# Patient Record
Sex: FEMALE | Race: WHITE | NOT HISPANIC OR LATINO | Employment: PART TIME | URBAN - METROPOLITAN AREA
[De-identification: names, ages, dates, MRNs, and addresses within clinical notes are randomized per-mention and may not be internally consistent; named-entity substitution may affect disease eponyms.]

---

## 2017-03-15 ENCOUNTER — OFFICE VISIT (OUTPATIENT)
Dept: OBSTETRICS AND GYNECOLOGY | Facility: CLINIC | Age: 27
End: 2017-03-15

## 2017-03-15 DIAGNOSIS — R87.619 ATYPICAL ENDOCERVICAL CELLS ON PAP SMEAR: Primary | ICD-10-CM

## 2017-03-15 DIAGNOSIS — Z30.09 BIRTH CONTROL COUNSELING: ICD-10-CM

## 2017-03-15 DIAGNOSIS — Z30.011 ENCOUNTER FOR INITIAL PRESCRIPTION OF CONTRACEPTIVE PILLS: ICD-10-CM

## 2017-03-15 DIAGNOSIS — N94.3 PREMENSTRUAL SYNDROME: ICD-10-CM

## 2017-03-15 PROCEDURE — 99213 OFFICE O/P EST LOW 20 MIN: CPT | Performed by: OBSTETRICS & GYNECOLOGY

## 2017-03-15 PROCEDURE — 57510 CAUTERIZATION OF CERVIX: CPT | Performed by: OBSTETRICS & GYNECOLOGY

## 2017-03-15 NOTE — PROGRESS NOTES
Subjective:    Patient Corinne Mendes is a 26 y.o. female.   Chief Complaint   Patient presents with   • Colposcopy   • Abnormal Pap Smear   • Consult     week before periods have become worse with emotions.     Patient's here for follow-up for atypical cells patient has very significant premenstrual disorder with depression anxiety and anger emotions weight gain    HPI  patient since the birth of her child 4 years ago is had very significant premenstrual disorder is actually getting worse   patient has tried some over-the-counter medications with no help periods are moderately heavy she did discuss hysterectomy she at the present time does not want further children long discussion carried out about birth control      The following portions of the patient's history were reviewed and updated as appropriate: allergies, current medications, past family history, past medical history, past social history, past surgical history and problem list.      Review of Systems   Constitutional: Negative.    HENT: Negative.    Eyes: Negative.    Respiratory: Negative.    Cardiovascular: Negative.    Gastrointestinal: Negative for abdominal distention, abdominal pain, anal bleeding, blood in stool, constipation, diarrhea, nausea, rectal pain and vomiting.   Endocrine: Negative for cold intolerance, heat intolerance, polydipsia, polyphagia and polyuria.   Genitourinary: Positive for menstrual problem. Negative for decreased urine volume, dyspareunia, dysuria, enuresis, flank pain, frequency, genital sores, hematuria, pelvic pain, urgency, vaginal bleeding, vaginal discharge and vaginal pain.        Patient has significant issues starting the week prior to the period with the multiple symptoms are relating to the premenstrual syndrome patient does not desire children and is essentially watching her cycle she does note when she ovulates which she has severe ovulation pain she gets the significant weight gain swelling depression anxiety  birth control options are discussed in detail   Musculoskeletal: Negative.    Skin: Negative.    Allergic/Immunologic: Negative.    Neurological: Negative.    Hematological: Negative for adenopathy. Does not bruise/bleed easily.   Psychiatric/Behavioral: Negative for agitation, confusion and sleep disturbance. The patient is not nervous/anxious.          Objective:      Physical Exam   Constitutional: She appears well-developed and well-nourished. She is not intubated.   HENT:   Head: Hair is normal.   Nose: Nose normal.   Mouth/Throat: Oropharynx is clear and moist.   Eyes: Conjunctivae are normal.   Neck: Normal carotid pulses and no JVD present. No tracheal tenderness, no spinous process tenderness and no muscular tenderness present. Carotid bruit is not present. No rigidity. No edema, no erythema and normal range of motion present. No thyroid mass and no thyromegaly present.   Cardiovascular: Normal rate, regular rhythm, S1 normal and normal heart sounds.  Exam reveals no gallop.    No murmur heard.  Pulmonary/Chest: Effort normal. No accessory muscle usage or stridor. No apnea, no tachypnea and no bradypnea. She is not intubated. No respiratory distress. She has no wheezes. She has no rales. She exhibits no tenderness. Right breast exhibits no inverted nipple, no mass, no nipple discharge, no skin change and no tenderness. Left breast exhibits no inverted nipple, no mass, no nipple discharge, no skin change and no tenderness.   Abdominal: Soft. Bowel sounds are normal. She exhibits no distension and no mass. There is no tenderness. There is no rebound and no guarding. No hernia.   Genitourinary: Vagina normal and uterus normal. Rectal exam shows no external hemorrhoid, no internal hemorrhoid, no fissure, no mass, no tenderness and anal tone normal. There is no rash, tenderness, lesion or injury on the right labia. There is no rash, tenderness, lesion or injury on the left labia. Uterus is not deviated, not  enlarged, not fixed and not tender. Cervix exhibits no motion tenderness, no discharge and no friability. Right adnexum displays no mass and no tenderness. Left adnexum displays no mass and no tenderness. No erythema, tenderness or bleeding in the vagina. No foreign body in the vagina. No signs of injury around the vagina. No vaginal discharge found.   Musculoskeletal: She exhibits no edema or tenderness.        Right shoulder: She exhibits no tenderness, no swelling, no pain and no spasm.   Lymphadenopathy:        Head (right side): No submental, no submandibular, no tonsillar, no preauricular, no posterior auricular and no occipital adenopathy present.        Head (left side): No submental, no submandibular, no tonsillar, no preauricular, no posterior auricular and no occipital adenopathy present.     She has no cervical adenopathy.        Right cervical: No superficial cervical, no deep cervical and no posterior cervical adenopathy present.       Left cervical: No superficial cervical, no deep cervical and no posterior cervical adenopathy present.        Right axillary: No pectoral and no lateral adenopathy present.        Left axillary: No pectoral and no lateral adenopathy present.       Right: No inguinal, no supraclavicular and no epitrochlear adenopathy present.        Left: No inguinal, no supraclavicular and no epitrochlear adenopathy present.   Neurological: No cranial nerve deficit. Coordination normal.   Skin: Skin is warm and dry. No abrasion, no bruising, no burn, no lesion, no petechiae, no purpura and no rash noted. Rash is not macular, not maculopapular, not nodular and not urticarial. No cyanosis or erythema. No pallor. Nails show no clubbing.   Psychiatric: She has a normal mood and affect. Her behavior is normal.     COLPOSCOPY  patient understands colposcopy Showed atypical cells the patient had no anesthesia Pap smear is repeated the cervix is reviewed without staining and appears grossly  normal the acetic acid staining's carried out she has a very small acetowhite area at 3:00  cautery of the cervix is carried out  patient had no complications the cervix appeared grossly normal and with a very small acetowhite area cautery should take care of it she had no pain tolerated the procedure without difficulty and no bleeding colposcopy was compatible with the Pap smear    Assessment and Plan: The patient had 2 major issues 1 is colposcopy for atypical cells cervix was cauterized be repeated in 4 months long discussions carried out about birth control ovulation pain PMS and the premenstrual disorder long discussions carried out about birth control options are the best option for this patient would be low low Estrin this is discussed in detail patient's given 2 packs to try and a prescription she'll call and let me know if the pills are doing well we expect which is no pain no PMS and very slight cycles  exam 15 minutes discussion 25+ minutes    Corinne was seen today for colposcopy, abnormal pap smear and consult.    Diagnoses and all orders for this visit:    Atypical endocervical cells on Pap smear    Premenstrual syndrome    Encounter for initial prescription of contraceptive pills    Birth control counseling

## 2017-03-16 LAB
CONV .: NORMAL
CYTOLOGIST CVX/VAG CYTO: NORMAL
CYTOLOGY CVX/VAG DOC THIN PREP: NORMAL
DX ICD CODE: NORMAL
HIV 1 & 2 AB SER-IMP: NORMAL
OTHER STN SPEC: NORMAL
PATH REPORT.FINAL DX SPEC: NORMAL
STAT OF ADQ CVX/VAG CYTO-IMP: NORMAL

## 2017-04-11 ENCOUNTER — ROUTINE PRENATAL (OUTPATIENT)
Dept: OBSTETRICS AND GYNECOLOGY | Facility: CLINIC | Age: 27
End: 2017-04-11

## 2017-04-11 ENCOUNTER — PROCEDURE VISIT (OUTPATIENT)
Dept: OBSTETRICS AND GYNECOLOGY | Facility: CLINIC | Age: 27
End: 2017-04-11

## 2017-04-11 VITALS — BODY MASS INDEX: 21.51 KG/M2 | DIASTOLIC BLOOD PRESSURE: 80 MMHG | SYSTOLIC BLOOD PRESSURE: 100 MMHG | WEIGHT: 121.4 LBS

## 2017-04-11 DIAGNOSIS — O03.9 MISCARRIAGE: Primary | ICD-10-CM

## 2017-04-11 DIAGNOSIS — O20.0 THREATENED MISCARRIAGE: Primary | ICD-10-CM

## 2017-04-11 PROCEDURE — 99213 OFFICE O/P EST LOW 20 MIN: CPT | Performed by: OBSTETRICS & GYNECOLOGY

## 2017-04-11 PROCEDURE — 76817 TRANSVAGINAL US OBSTETRIC: CPT | Performed by: OBSTETRICS & GYNECOLOGY

## 2017-04-11 NOTE — PROGRESS NOTES
Ms. Mendes is a 26-year-old  3 para 1 who presents at 7 weeks from her last menstrual period due to a possible miscarriage.  She reports that her cycle had been regular and predictable with a last normal period approximately 7 weeks ago.  She began to experience heavy bleeding last night with cramps and clots and the passage of tissue.  This persisted until approximately 8:30 this morning and then tapered to spotting with a little bit of cramping.    Past medical history significant for anxiety and atypical Paps  Past surgical history is significant for prior  delivery  Allergies none known    Physical examination lungs are clear to auscultation bilaterally heart has regular rate and rhythm  The abdomen is soft and nondistended it is nontender to palpation  Pelvic examination reveals normal female external genitalia the vagina has small moderate amounts of old blood at the vault.  Cervical os is closed.  The uterus is approximately 6 weeks' size.  It is nontender.  No adnexal masses are palpable  Extremities are equal in size with no edema    Ultrasound was reviewed and discussed with the patient.  It shows a thickened endometrium with significant tissue.  No gestational sac or yolk sac is visible    Assessment and plan:  history and physical findings are most consistent with an incomplete AB.  We discussed the meaning of these findings and other possibilities with the patient.  We also discussed options for management.  I will obtain a quantitative hCG today as well as a type and screen.  If the patient is Rh-, she will require RhoGAM shot.  She would like to avoid a Dand C if at all possible.  At this time, the plan is to follow quantitative hCGs weekly until they fall to 0.  The patient will call for worsening of her bleeding, fever, chills or severe abdominal pain.  Otherwise, she will obtain a quantitative hCG each week

## 2017-04-12 LAB — HCG INTACT+B SERPL-ACNC: 328.5 MIU/ML

## 2017-04-13 ENCOUNTER — TELEPHONE (OUTPATIENT)
Dept: OBSTETRICS AND GYNECOLOGY | Facility: CLINIC | Age: 27
End: 2017-04-13

## 2017-04-19 ENCOUNTER — OFFICE VISIT (OUTPATIENT)
Dept: OBSTETRICS AND GYNECOLOGY | Facility: CLINIC | Age: 27
End: 2017-04-19

## 2017-04-19 VITALS
DIASTOLIC BLOOD PRESSURE: 70 MMHG | BODY MASS INDEX: 21.12 KG/M2 | SYSTOLIC BLOOD PRESSURE: 108 MMHG | HEIGHT: 63 IN | WEIGHT: 119.2 LBS

## 2017-04-19 DIAGNOSIS — O02.1 MISSED AB: Primary | ICD-10-CM

## 2017-04-19 LAB — HCG INTACT+B SERPL-ACNC: 2.7 MIU/ML

## 2017-04-19 PROCEDURE — 99212 OFFICE O/P EST SF 10 MIN: CPT | Performed by: OBSTETRICS & GYNECOLOGY

## 2017-04-19 NOTE — PROGRESS NOTES
Subjective   Corinne Mendes  is a 26 y.o. female who is following up from a miscarriage.  Last week, she passed tissue and her bleeding has slowed and nearly stopped.  No more pain or cramps.    Chief Complaint   Patient presents with   • Follow-up       HPI    Past Medical History:   Diagnosis Date   • Abnormal Pap smear of cervix    • Kidney stones        Past Surgical History:   Procedure Laterality Date   •  SECTION         Social History     Social History   • Marital status: Single     Spouse name: N/A   • Number of children: N/A   • Years of education: N/A     Occupational History   • Not on file.     Social History Main Topics   • Smoking status: Current Some Day Smoker   • Smokeless tobacco: Not on file      Comment: Part time smoker   • Alcohol use Yes      Comment: social   • Drug use: No   • Sexual activity: Yes     Other Topics Concern   • Not on file     Social History Narrative       The following portions of the patient's history were reviewed and updated as appropriate: allergies, current medications, past family history, past medical history, past social history, past surgical history and problem list.    Review of Systems    Objective     Physical Exam   Constitutional: She appears well-developed and well-nourished.   Abdominal: Soft. She exhibits no distension. There is no tenderness.   Genitourinary:   Genitourinary Comments: There is no blood in the vaginal vault.  Cervical os is closed.  Uterus is normal in size and nontender.  No adnexal masses are palpable   Nursing note and vitals reviewed.      Assessment    Corinne was seen today for follow-up.    Diagnoses and all orders for this visit:    Missed ab  -     HCG, B-subunit, Quantitative        Plan  1.  Likely a completed AB.  Counseled.  We will follow quantitative hCGs to 0.  Type and screen will be performed today.  2.  3.  4.

## 2017-04-20 LAB
ABO GROUP BLD: NORMAL
RH BLD: POSITIVE

## 2017-04-25 ENCOUNTER — TELEPHONE (OUTPATIENT)
Dept: OBSTETRICS AND GYNECOLOGY | Facility: CLINIC | Age: 27
End: 2017-04-25

## 2017-04-25 NOTE — TELEPHONE ENCOUNTER
----- Message from Kelton Molina MD sent at 4/24/2017  3:11 PM EDT -----  I could not reach the patient by phone today.  If she calls you, please let her know that her quantitative hCG has decreased to essentially 0 and that she no longer needs to do weekly quant HCGs  Thank you

## 2017-07-05 ENCOUNTER — TELEPHONE (OUTPATIENT)
Dept: OBSTETRICS AND GYNECOLOGY | Facility: CLINIC | Age: 27
End: 2017-07-05

## 2017-08-23 ENCOUNTER — OFFICE VISIT (OUTPATIENT)
Dept: OBSTETRICS AND GYNECOLOGY | Facility: CLINIC | Age: 27
End: 2017-08-23

## 2017-08-23 ENCOUNTER — PROCEDURE VISIT (OUTPATIENT)
Dept: OBSTETRICS AND GYNECOLOGY | Facility: CLINIC | Age: 27
End: 2017-08-23

## 2017-08-23 VITALS
HEIGHT: 63 IN | SYSTOLIC BLOOD PRESSURE: 116 MMHG | DIASTOLIC BLOOD PRESSURE: 78 MMHG | WEIGHT: 121.2 LBS | BODY MASS INDEX: 21.48 KG/M2

## 2017-08-23 DIAGNOSIS — N83.202 CYST OF LEFT OVARY: Primary | ICD-10-CM

## 2017-08-23 DIAGNOSIS — N83.202 CYST OF LEFT OVARY: ICD-10-CM

## 2017-08-23 DIAGNOSIS — Z01.411 ENCOUNTER FOR GYNECOLOGICAL EXAMINATION WITH ABNORMAL FINDING: ICD-10-CM

## 2017-08-23 PROCEDURE — 76830 TRANSVAGINAL US NON-OB: CPT | Performed by: OBSTETRICS & GYNECOLOGY

## 2017-08-23 PROCEDURE — 99214 OFFICE O/P EST MOD 30 MIN: CPT | Performed by: OBSTETRICS & GYNECOLOGY

## 2017-08-23 NOTE — PROGRESS NOTES
Subjective:    Patient Corinne Mendes is a 27 y.o. female.   Chief Complaint   Patient presents with   • Colposcopy     Pt is here for repeat pap and colposcopy   • Consult     Pt has questions about BC. also having chest pain.    CC  's patient complains about the upper abdominal and chest discomfort which is eased by drinking water or something that it's cold the patient's having no diarrhea she is on the birth control pill the low Loestrin the patient has complained of significant breast tenderness bilateral for the several days      HPI  she is here for a repeat Pap smear and colposcopy the patient on the low low Estrin has felt dramatically better she does not get the PMS symptoms although she has had the breast tenderness      The following portions of the patient's history were reviewed and updated as appropriate: allergies, current medications, past family history, past medical history, past social history, past surgical history and problem list.      Review of Systems   Constitutional: Negative.         Breast tenderness bilateral   HENT: Negative.    Eyes: Negative.    Respiratory: Negative.    Cardiovascular: Negative.    Gastrointestinal: Negative for abdominal distention, abdominal pain, anal bleeding, blood in stool, constipation, diarrhea, nausea, rectal pain and vomiting.        Upper GI and chest discomfort relieved by [place] are something cold  she recommended that the patient get some Zantac 150s take morning and night and staying away from greasy foods   Endocrine: Negative for cold intolerance, heat intolerance, polydipsia, polyphagia and polyuria.   Genitourinary: Negative.  Negative for decreased urine volume, dyspareunia, dysuria, enuresis, flank pain, frequency, genital sores, hematuria, menstrual problem, pelvic pain, urgency, vaginal bleeding, vaginal discharge and vaginal pain.   Musculoskeletal: Negative.    Skin: Negative.    Allergic/Immunologic: Negative.    Neurological: Negative.     Hematological: Negative for adenopathy. Does not bruise/bleed easily.   Psychiatric/Behavioral: Negative for agitation, confusion and sleep disturbance. The patient is not nervous/anxious.            Objective:      Physical Exam   Constitutional: She appears well-developed and well-nourished. She is not intubated.   HENT:   Head: Hair is normal.   Nose: Nose normal.   Mouth/Throat: Oropharynx is clear and moist.   Eyes: Conjunctivae are normal.   Neck: Normal carotid pulses and no JVD present. No tracheal tenderness, no spinous process tenderness and no muscular tenderness present. Carotid bruit is not present. No rigidity. No edema, no erythema and normal range of motion present. No thyroid mass and no thyromegaly present.   Cardiovascular: Normal rate, regular rhythm, S1 normal and normal heart sounds.  Exam reveals no gallop.    No murmur heard.  Pulmonary/Chest: Effort normal. No accessory muscle usage or stridor. No apnea, no tachypnea and no bradypnea. She is not intubated. No respiratory distress. She has no wheezes. She has no rales. She exhibits no tenderness. Right breast exhibits no inverted nipple, no mass, no nipple discharge, no skin change and no tenderness. Left breast exhibits no inverted nipple, no mass, no nipple discharge, no skin change and no tenderness.   Abdominal: Soft. Bowel sounds are normal. She exhibits no distension and no mass. There is no tenderness. There is no rebound and no guarding. No hernia.   Genitourinary: Vagina normal and uterus normal. Rectal exam shows no external hemorrhoid, no internal hemorrhoid, no fissure, no mass, no tenderness and anal tone normal. There is no rash, tenderness, lesion or injury on the right labia. There is no rash, tenderness, lesion or injury on the left labia. Uterus is not deviated, not enlarged, not fixed and not tender. Cervix exhibits no motion tenderness, no discharge and no friability. Right adnexum displays no mass and no tenderness.  Left adnexum displays no mass and no tenderness. No erythema, tenderness or bleeding in the vagina. No foreign body in the vagina. No signs of injury around the vagina. No vaginal discharge found.   Genitourinary Comments: Possible left ovarian cyst will get the ultrasound to be sure patient does not feel pregnant she had a miscarriage 4 months ago   Musculoskeletal: She exhibits no edema or tenderness.        Right shoulder: She exhibits no tenderness, no swelling, no pain and no spasm.   Lymphadenopathy:        Head (right side): No submental, no submandibular, no tonsillar, no preauricular, no posterior auricular and no occipital adenopathy present.        Head (left side): No submental, no submandibular, no tonsillar, no preauricular, no posterior auricular and no occipital adenopathy present.     She has no cervical adenopathy.        Right cervical: No superficial cervical, no deep cervical and no posterior cervical adenopathy present.       Left cervical: No superficial cervical, no deep cervical and no posterior cervical adenopathy present.        Right axillary: No pectoral and no lateral adenopathy present.        Left axillary: No pectoral and no lateral adenopathy present.       Right: No inguinal, no supraclavicular and no epitrochlear adenopathy present.        Left: No inguinal, no supraclavicular and no epitrochlear adenopathy present.   Neurological: No cranial nerve deficit. Coordination normal.   Skin: Skin is warm and dry. No abrasion, no bruising, no burn, no lesion, no petechiae, no purpura and no rash noted. Rash is not macular, not maculopapular, not nodular and not urticarial. No cyanosis or erythema. No pallor. Nails show no clubbing.   Psychiatric: She has a normal mood and affect. Her behavior is normal.         Assessment and Plan:  Ultrasound OB obtained today to assess ovary activity and to be sure the patient is not pregnant  's ultrasound is done and she returns to the office we  reviewed it in detail endometrial lining is very thin patient does have what appears to be a corpus luteum cyst on the left ovary which confirms the feeling that the ovaries function this month in spite of the fact that the patient was on low Loestrin plan to change the pills to Zovia 35 patient should have a period after this cycle she is to take the 2 Brown pills and then stay off the pill 2 more days  so she'll be off the pill 4 days this cycle I expect that she will have a slight period but it should not be heavy since there is not much tissue in the endometrium so the plan will be to restart the Zovia 35 after the 4 days and take 21×3  stay off the pill 3 days and due the 21×3 a O were going to have a period every 3 months should stop the ovary function the patient should be seen and 3-4 months for repeat colposcopy and Pap smear   the chest pain or upper GI pain the patient needs to see her family physician she is told to try the Zantac 150 twice a day OTC she may need to have her gallbladder checked provided the present time will tried the and masses and see if that gets things controlled    Corinne was seen today for colposcopy and consult.    Diagnoses and all orders for this visit:    Cyst of left ovary  -     US Pelvis Complete    Encounter for gynecological examination with abnormal finding  -     Pap IG, Rfx HPV ASCU

## 2018-06-04 ENCOUNTER — OFFICE VISIT (OUTPATIENT)
Dept: OBSTETRICS AND GYNECOLOGY | Facility: CLINIC | Age: 28
End: 2018-06-04

## 2018-06-04 VITALS
DIASTOLIC BLOOD PRESSURE: 80 MMHG | BODY MASS INDEX: 21.62 KG/M2 | HEIGHT: 63 IN | WEIGHT: 122 LBS | SYSTOLIC BLOOD PRESSURE: 100 MMHG

## 2018-06-04 DIAGNOSIS — N64.4 BREAST TENDERNESS IN FEMALE: ICD-10-CM

## 2018-06-04 DIAGNOSIS — Z01.419 ENCOUNTER FOR ANNUAL ROUTINE GYNECOLOGICAL EXAMINATION: ICD-10-CM

## 2018-06-04 DIAGNOSIS — Z30.41 ENCOUNTER FOR BIRTH CONTROL PILLS MAINTENANCE: Primary | ICD-10-CM

## 2018-06-04 PROCEDURE — 99395 PREV VISIT EST AGE 18-39: CPT | Performed by: OBSTETRICS & GYNECOLOGY

## 2018-06-04 NOTE — PROGRESS NOTES
Subjective:    Patient Corinne Mendes is a 27 y.o. female.   Chief Complaint   Patient presents with   • Gynecologic Exam     AE, NO HYST, FORMER SMOKER   CCPatient still gets some breast tenderness at the end of the cycle she is taking all 7 placebos which change the way that she is taking the pill and the patient's given the option of taking only 3 of the placebo pills every cycle so the prescriptions given to suppress the cycles so that given to her when she needs them      HPI patient feels 1000% better since she is on the pill having significantly less issues with PMS severe breast tenderness and heavier periods patient now only spots for 2 days but still gets breast tenderness      The following portions of the patient's history were reviewed and updated as appropriate: allergies, current medications, past family history, past medical history, past social history, past surgical history and problem list.      Review of Systems   Constitutional: Negative.    HENT: Negative.    Eyes: Negative.    Respiratory: Negative.    Cardiovascular: Negative.    Gastrointestinal: Negative for abdominal distention, abdominal pain, anal bleeding, blood in stool, constipation, diarrhea, nausea, rectal pain and vomiting.   Endocrine: Negative for cold intolerance, heat intolerance, polydipsia, polyphagia and polyuria.   Genitourinary: Negative.  Negative for decreased urine volume, dyspareunia, dysuria, enuresis, flank pain, frequency, genital sores, hematuria, menstrual problem, pelvic pain, urgency, vaginal bleeding, vaginal discharge and vaginal pain.   Musculoskeletal: Negative.    Skin: Negative.    Allergic/Immunologic: Negative.    Neurological: Negative.    Hematological: Negative for adenopathy. Does not bruise/bleed easily.   Psychiatric/Behavioral: Negative for agitation, confusion and sleep disturbance. The patient is not nervous/anxious.          Objective:      Physical Exam   Constitutional: She appears  well-developed and well-nourished. She is not intubated.   HENT:   Head: Hair is normal.   Nose: Nose normal.   Mouth/Throat: Oropharynx is clear and moist.   Eyes: Conjunctivae are normal.   Neck: Normal carotid pulses and no JVD present. No tracheal tenderness, no spinous process tenderness and no muscular tenderness present. Carotid bruit is not present. No neck rigidity. No edema, no erythema and normal range of motion present. No thyroid mass and no thyromegaly present.   Cardiovascular: Normal rate, regular rhythm, S1 normal and normal heart sounds.  Exam reveals no gallop.    No murmur heard.  Pulmonary/Chest: Effort normal. No accessory muscle usage or stridor. No apnea, no tachypnea and no bradypnea. She is not intubated. No respiratory distress. She has no wheezes. She has no rales. She exhibits no tenderness. Right breast exhibits no inverted nipple, no mass, no nipple discharge, no skin change and no tenderness. Left breast exhibits no inverted nipple, no mass, no nipple discharge, no skin change and no tenderness.   Abdominal: Soft. Bowel sounds are normal. She exhibits no distension and no mass. There is no tenderness. There is no rebound and no guarding. No hernia.   Genitourinary: Vagina normal and uterus normal. Rectal exam shows no external hemorrhoid, no internal hemorrhoid, no fissure, no mass, no tenderness and anal tone normal. There is no rash, tenderness, lesion or injury on the right labia. There is no rash, tenderness, lesion or injury on the left labia. Uterus is not deviated, not enlarged, not fixed and not tender. Cervix exhibits no motion tenderness, no discharge and no friability. Right adnexum displays no mass and no tenderness. Left adnexum displays no mass and no tenderness. No erythema, tenderness or bleeding in the vagina. No foreign body in the vagina. No signs of injury around the vagina. No vaginal discharge found.   Musculoskeletal: She exhibits no edema or tenderness.         Right shoulder: She exhibits no tenderness, no swelling, no pain and no spasm.   Lymphadenopathy:        Head (right side): No submental, no submandibular, no tonsillar, no preauricular, no posterior auricular and no occipital adenopathy present.        Head (left side): No submental, no submandibular, no tonsillar, no preauricular, no posterior auricular and no occipital adenopathy present.     She has no cervical adenopathy.        Right cervical: No superficial cervical, no deep cervical and no posterior cervical adenopathy present.       Left cervical: No superficial cervical, no deep cervical and no posterior cervical adenopathy present.        Right axillary: No pectoral and no lateral adenopathy present.        Left axillary: No pectoral and no lateral adenopathy present.       Right: No inguinal, no supraclavicular and no epitrochlear adenopathy present.        Left: No inguinal, no supraclavicular and no epitrochlear adenopathy present.   Neurological: No cranial nerve deficit. Coordination normal.   Skin: Skin is warm and dry. No abrasion, no bruising, no burn, no lesion, no petechiae, no purpura and no rash noted. Rash is not macular, not maculopapular, not nodular and not urticarial. No cyanosis or erythema. No pallor. Nails show no clubbing.   Psychiatric: She has a normal mood and affect. Her behavior is normal.         Assessment and Plan: The patient was getting some breakthrough ovarian activity characterized by breast tenderness so a change the way she is taking the pill to staying off only 3 days each month are doing 3 months and 3 days off    Patient has been instructed to perform a self breast exam on a weekly basis, a yearly mammogram, pap smear yearly unless instructed otherwise and bone density every 2 years.  I recommended that the patient not smoke, and discussed smoking cessation when appropriate.     Corinne was seen today for gynecologic exam.    Diagnoses and all orders for this  visit:    Encounter for birth control pills maintenance    Breast tenderness in female

## 2018-06-27 ENCOUNTER — TELEPHONE (OUTPATIENT)
Dept: OBSTETRICS AND GYNECOLOGY | Facility: CLINIC | Age: 28
End: 2018-06-27

## 2018-06-27 NOTE — TELEPHONE ENCOUNTER
Called pharm and told the pharm she could have a years worth of refills.   ----- Message from Magy Simmons sent at 6/27/2018  9:34 AM EDT -----  Pharmacy called, trying to refill PT's BC, pharmacy has faxed it under  but it looks like she is a  PT. Pharmacy states it is [facility] 1/35mg. Pharmacy #[phone]

## 2019-06-17 RX ORDER — ETHYNODIOL DIACETATE AND ETHINYL ESTRADIOL 1 MG-35MCG
KIT ORAL
Qty: 84 TABLET | Refills: 3 | Status: SHIPPED | OUTPATIENT
Start: 2019-06-17

## 2019-08-09 ENCOUNTER — OFFICE VISIT (OUTPATIENT)
Dept: OBSTETRICS AND GYNECOLOGY | Facility: CLINIC | Age: 29
End: 2019-08-09

## 2019-08-09 VITALS
DIASTOLIC BLOOD PRESSURE: 60 MMHG | WEIGHT: 126.2 LBS | HEIGHT: 63 IN | SYSTOLIC BLOOD PRESSURE: 106 MMHG | BODY MASS INDEX: 22.36 KG/M2

## 2019-08-09 DIAGNOSIS — R10.31 RLQ ABDOMINAL PAIN: Primary | ICD-10-CM

## 2019-08-09 PROCEDURE — 99214 OFFICE O/P EST MOD 30 MIN: CPT | Performed by: OBSTETRICS & GYNECOLOGY

## 2019-08-09 NOTE — PROGRESS NOTES
KAELYN Mendes  is a 29 y.o. female who presents for evaluation of right lower quadrant pain.  She reports that she has a history of ovarian cysts in the past, but has not had one lately.  She takes oral contraceptive pills for contraception and for ovarian suppression.  For the last 6 months, she has intermittent right lower quadrant abdominal pain.  This occurs approximately twice weekly.  It lasts for approximately 1 hour when it occurs.  The pain is severe at that time.  It then resolves completely until the next episode.  It is not associated with nausea or vomiting.  It is not associated with fever or chills.  It is not associated with a change in bowel or bladder habits.  It is not associated with sex.  It is not associated with a particular time in the menstrual cycle.  It has neither improved, nor has it worsened over the last 6 months.  Last episode was 3 days ago.    Chief Complaint   Patient presents with   • Follow-up     Patient is here for a f/u for right pelvic pain.       Past Medical History:   Diagnosis Date   • Abnormal Pap smear of cervix    • Kidney stones        Past Surgical History:   Procedure Laterality Date   •  SECTION         Social History     Socioeconomic History   • Marital status: Single     Spouse name: Not on file   • Number of children: Not on file   • Years of education: Not on file   • Highest education level: Not on file   Tobacco Use   • Smoking status: Former Smoker   • Tobacco comment: Part time smoker   Substance and Sexual Activity   • Alcohol use: Yes     Comment: social   • Drug use: No   • Sexual activity: Yes       The following portions of the patient's history were reviewed and updated as appropriate: allergies, current medications, past family history, past medical history, past social history, past surgical history and problem list.    Review of Systems this is positive for intermittent right lower quadrant pain.  It is negative for fever or chills.   It is negative for nausea or vomiting.  It is negative for irregular cycles.  It is negative for dysmenorrhea or dyspareunia.  All other systems are reviewed and are negative          Physical Exam   Constitutional: She is oriented to person, place, and time. She appears well-developed and well-nourished.   Abdominal: Soft. She exhibits no distension and no mass. There is no tenderness.   Genitourinary:   Genitourinary Comments: Normal female external genitalia  The vagina is estrogenized with no vaginal discharge  The cervix is normal in appearance.  It is nontender to palpation  The uterus is mobile within the pelvis.  It is normal in size.  It is nontender  No adnexal masses are palpable.  There is mild tenderness overlying the right adnexa   Neurological: She is alert and oriented to person, place, and time.   Skin: Skin is warm and dry.   Psychiatric: She has a normal mood and affect. Her behavior is normal.   Nursing note and vitals reviewed.      Assessment    Corinne was seen today for follow-up.    Diagnoses and all orders for this visit:    RLQ abdominal pain        Plan  1. Counseled regarding possible causes of intermittent right lower quadrant pelvic pain.  The patient does have a history of ovarian cysts, but feels that this pain is different from her ovarian cyst pain.  We discussed gynecologic, gastroenterologic and musculoskeletal causes.  I answered the patient's questions.  At this point, I recommend that we begin the work-up with an ultrasound.  Further diagnosis and treatment pending the results of this study.  The patient agrees with this recommendation.    Social History     Tobacco Use   Smoking Status Former Smoker   Tobacco Comment    Part time smoker   2.     3.

## 2019-08-14 ENCOUNTER — PROCEDURE VISIT (OUTPATIENT)
Dept: OBSTETRICS AND GYNECOLOGY | Facility: CLINIC | Age: 29
End: 2019-08-14

## 2019-08-14 DIAGNOSIS — R10.31 RLQ ABDOMINAL PAIN: Primary | ICD-10-CM

## 2019-08-14 PROCEDURE — 76830 TRANSVAGINAL US NON-OB: CPT | Performed by: OBSTETRICS & GYNECOLOGY

## 2019-09-10 ENCOUNTER — TELEPHONE (OUTPATIENT)
Dept: OBSTETRICS AND GYNECOLOGY | Facility: CLINIC | Age: 29
End: 2019-09-10
